# Patient Record
Sex: MALE | Race: WHITE | NOT HISPANIC OR LATINO | Employment: OTHER | URBAN - METROPOLITAN AREA
[De-identification: names, ages, dates, MRNs, and addresses within clinical notes are randomized per-mention and may not be internally consistent; named-entity substitution may affect disease eponyms.]

---

## 2023-02-07 PROBLEM — M54.16 LUMBAR RADICULITIS: Status: ACTIVE | Noted: 2023-02-07

## 2023-02-07 PROBLEM — M79.673 PAIN OF FOOT: Status: ACTIVE | Noted: 2023-02-07

## 2023-02-07 PROBLEM — M79.671 RIGHT FOOT PAIN: Status: ACTIVE | Noted: 2023-02-07

## 2023-02-07 PROBLEM — D69.6 THROMBOCYTOPENIA (CMS-HCC): Status: ACTIVE | Noted: 2023-02-07

## 2023-02-07 PROBLEM — B02.22 TRIGEMINAL NEURALGIA, POSTHERPETIC: Status: ACTIVE | Noted: 2023-02-07

## 2023-02-07 PROBLEM — N45.2 ORCHITIS: Status: ACTIVE | Noted: 2023-02-07

## 2023-02-07 PROBLEM — E78.5 DYSLIPIDEMIA: Status: ACTIVE | Noted: 2023-02-07

## 2023-02-07 PROBLEM — E55.9 VITAMIN D DEFICIENCY: Status: ACTIVE | Noted: 2023-02-07

## 2023-02-07 PROBLEM — Z86.19 HISTORY OF SHINGLES: Status: ACTIVE | Noted: 2023-02-07

## 2023-02-07 PROBLEM — I10 BENIGN HYPERTENSION: Status: ACTIVE | Noted: 2023-02-07

## 2023-02-07 PROBLEM — E29.1 HYPOGONADISM MALE: Status: ACTIVE | Noted: 2023-02-07

## 2023-02-07 PROBLEM — M77.41 METATARSALGIA OF RIGHT FOOT: Status: ACTIVE | Noted: 2023-02-07

## 2023-02-07 PROBLEM — K63.5 HYPERPLASTIC COLON POLYP: Status: ACTIVE | Noted: 2023-02-07

## 2023-02-07 RX ORDER — TESTOSTERONE 20.25 MG/1.25G
GEL TOPICAL
COMMUNITY
Start: 2021-05-17 | End: 2024-01-08 | Stop reason: WASHOUT

## 2023-02-07 RX ORDER — LISINOPRIL 10 MG/1
1 TABLET ORAL DAILY
COMMUNITY
Start: 2018-01-03 | End: 2024-01-08 | Stop reason: SDUPTHER

## 2023-03-20 PROBLEM — N45.2 ORCHITIS: Status: RESOLVED | Noted: 2023-02-07 | Resolved: 2023-03-20

## 2023-03-20 PROBLEM — M79.671 RIGHT FOOT PAIN: Status: RESOLVED | Noted: 2023-02-07 | Resolved: 2023-03-20

## 2023-03-20 PROBLEM — M79.673 PAIN OF FOOT: Status: RESOLVED | Noted: 2023-02-07 | Resolved: 2023-03-20

## 2023-03-21 ENCOUNTER — OFFICE VISIT (OUTPATIENT)
Dept: PRIMARY CARE | Facility: CLINIC | Age: 68
End: 2023-03-21
Payer: MEDICARE

## 2023-03-21 VITALS
HEART RATE: 76 BPM | DIASTOLIC BLOOD PRESSURE: 80 MMHG | WEIGHT: 172 LBS | BODY MASS INDEX: 25.4 KG/M2 | SYSTOLIC BLOOD PRESSURE: 132 MMHG | OXYGEN SATURATION: 96 %

## 2023-03-21 DIAGNOSIS — R29.2 HYPERREFLEXIA OF LOWER EXTREMITY: ICD-10-CM

## 2023-03-21 DIAGNOSIS — M51.37 DISC DISEASE, DEGENERATIVE, LUMBAR OR LUMBOSACRAL: ICD-10-CM

## 2023-03-21 DIAGNOSIS — E78.5 DYSLIPIDEMIA: ICD-10-CM

## 2023-03-21 DIAGNOSIS — E55.9 VITAMIN D DEFICIENCY: Primary | ICD-10-CM

## 2023-03-21 DIAGNOSIS — D69.6 THROMBOCYTOPENIA (CMS-HCC): ICD-10-CM

## 2023-03-21 PROBLEM — K63.5 HYPERPLASTIC COLON POLYP: Status: RESOLVED | Noted: 2023-02-07 | Resolved: 2023-03-21

## 2023-03-21 PROBLEM — Z86.19 HISTORY OF SHINGLES: Status: RESOLVED | Noted: 2023-02-07 | Resolved: 2023-03-21

## 2023-03-21 PROBLEM — B02.22 TRIGEMINAL NEURALGIA, POSTHERPETIC: Status: RESOLVED | Noted: 2023-02-07 | Resolved: 2023-03-21

## 2023-03-21 PROBLEM — M77.41 METATARSALGIA OF RIGHT FOOT: Status: RESOLVED | Noted: 2023-02-07 | Resolved: 2023-03-21

## 2023-03-21 PROCEDURE — 3075F SYST BP GE 130 - 139MM HG: CPT | Performed by: FAMILY MEDICINE

## 2023-03-21 PROCEDURE — 1160F RVW MEDS BY RX/DR IN RCRD: CPT | Performed by: FAMILY MEDICINE

## 2023-03-21 PROCEDURE — 1159F MED LIST DOCD IN RCRD: CPT | Performed by: FAMILY MEDICINE

## 2023-03-21 PROCEDURE — 1157F ADVNC CARE PLAN IN RCRD: CPT | Performed by: FAMILY MEDICINE

## 2023-03-21 PROCEDURE — 99214 OFFICE O/P EST MOD 30 MIN: CPT | Performed by: FAMILY MEDICINE

## 2023-03-21 PROCEDURE — 1036F TOBACCO NON-USER: CPT | Performed by: FAMILY MEDICINE

## 2023-03-21 PROCEDURE — 3079F DIAST BP 80-89 MM HG: CPT | Performed by: FAMILY MEDICINE

## 2023-03-21 ASSESSMENT — PATIENT HEALTH QUESTIONNAIRE - PHQ9
SUM OF ALL RESPONSES TO PHQ9 QUESTIONS 1 AND 2: 0
2. FEELING DOWN, DEPRESSED OR HOPELESS: NOT AT ALL
1. LITTLE INTEREST OR PLEASURE IN DOING THINGS: NOT AT ALL

## 2023-03-21 NOTE — PATIENT INSTRUCTIONS
You are due for a repeat Medicare Wellness Examine in September 2023.     Continue yearly eye and dental examines.     Colonoscopy was done in 2018 (repeat in 5-10 years)     Labs in September 2022 showed normal blood coutns, normal electrolytes, normalliver, kidney and thyroid function, your cholesterol was slightly elevated and your Current 10-Year ASCVD Risk: is 12.18% -which is considered Intermediate Risk for having a heart attack or stroke, anyone with a score > 7% is recommended to start a lipid-lowering medication sich as rosuvastatin 10 mg at bedtime, please let us know if agreeable to start.    Blood pressure looks great today at 132/80    follow up with derm for the skin lesions    Please consider the pneumonia shot series.     Please get MRI of the lumbar spine once approved by insurance.   We reviewed you last Lumbar xray.

## 2023-03-21 NOTE — PROGRESS NOTES
Subjective   Jasvir Puckett is a 67 y.o. male who presents for Follow-up (6 month follow up).    HPI  pt is using a CBT vape and is helping   last seen in May 2022    #) bilateral flank pain   - more on the left but can migrate to the right   - worse when cleaning (such as mopping)   - also with right lateral calf and some radiating into the anterior knee and anterior thigh- still bothersome with sleeping   - pain worse with lying flat  - never had Kidney stones in the past   - seems to improved with rest   - no OTC for pain   - no urinary symptoms or hematuria   - no nocturia   - no fever or chills   - occasional right radicular symptoms when sleeping   - Had Xrays on 5/19/21- Mild disc space height loss L5-S1. Miniscule anterior spurring not out of proportion to patient's age. Vertebral alignment is normal. No fracture or paraspinal mass. Minimal levoscoliosis apex at L3. No spondylolysis.   - has been getting manual therapy in the form of chiropractic manipulation and massage for several years, with some benefit but not sustained   - also has tried an inversion table.     #) Left ear pain - resolving, mostly gone   - still getting popping with blowing the nose   - had had several deviated septum surgeries   - hearing is good, no ringing in the ears   - chronic sinus congestion on allergy medications on and off   - no headache or dizziness   - no ear trauma  - some cerumen impaction- peroxide has seemed to help     #) HTN -- 132/80 today was great   - has a BP cuff at home  - has not been checking recently   - no headache , chest pain , dyspnea   - no palpitations     #) skin cancer on scalp - squamous in situ  - excision completed and all resected and healing well   - to get a right cheek excision- was squamous   - to follow up 6 months.     #) weight loss and cant gain muscle- weight is stable   - was on androgel in the past   - h/o hypogonadism on Androgel and viagra in the past.   - normal Testosterone on  11/1/17, recheck now   - has been a lifelong weight ; strength is good   - last used androgel   - sleep and mood is good     #) BPH- saw Dr Pfeiffer (nephro)  - no recommendation   - hasn’t seen for several years   - no hematuria   - PSA 11/1/17 0.86    #) Sleep apnea does not wearing CPAP seen by Dr. Garcia in the past   - Former smoker.   - denies dyspnea, cough or wheeze   - reports sleep is good    #) H/o HLD and was on niacin 500 mg and fish oil 1000 mg in the past.   - not currently taking any cholesterol medications   - checked on 11/1/17  - , HDL 69    #) depression seen by Dr. Quiñones in the past, on no medications  - mood is good   - denies HI/SI   - reports mood and sleep are good     #) Preventive:  Smoker: former   Etoh: occasional   Fam h/o: biological family unknown  Colonoscopy: 12/2017, repeat in 10 yrs, 1/29/18  PSA: 1.26 on 11/27/15  ASA 81: denies use  HepC screen: 11/1/17 NR   Fasting blood work: 1/18/14, 11/27/15, 11/1/17 , repeat now   Pt believes he is UTD on vaccines.  Nonsmoker.   Ashley Ha was prior PCP.   Last colonoscopy in 2002. Pt needs repeat colonoscopy. will check into getting with current provider.   declining the shingles shot    ROS was completed and all systems are negative with the exception of what was noted in the the HPI.     Objective     /80   Pulse 76   Wt 78 kg (172 lb)   SpO2 96%   BMI 25.40 kg/m²      Physical Exam  Vitals reviewed.   Constitutional:       General: He is not in acute distress.     Appearance: He is not toxic-appearing.   HENT:      Head: Normocephalic and atraumatic.      Right Ear: Tympanic membrane and ear canal normal. There is no impacted cerumen.      Left Ear: Tympanic membrane and ear canal normal. There is no impacted cerumen.      Nose: No congestion or rhinorrhea.      Mouth/Throat:      Mouth: Mucous membranes are moist.      Pharynx: No oropharyngeal exudate or posterior oropharyngeal erythema.   Eyes:      Extraocular  Movements: Extraocular movements intact.      Conjunctiva/sclera: Conjunctivae normal.      Pupils: Pupils are equal, round, and reactive to light.   Cardiovascular:      Rate and Rhythm: Normal rate and regular rhythm.      Heart sounds: Normal heart sounds. No murmur heard.  Pulmonary:      Effort: No respiratory distress.      Breath sounds: No wheezing.   Abdominal:      General: Bowel sounds are normal.      Palpations: Abdomen is soft.      Tenderness: There is no abdominal tenderness.   Musculoskeletal:         General: No deformity. Normal range of motion.      Cervical back: Neck supple. No tenderness.      Right lower leg: No edema.      Left lower leg: No edema.      Comments: Hyperreflexia of the right L4 (3/4) . slight atrophy of the right calf.    Lymphadenopathy:      Cervical: No cervical adenopathy.   Skin:     Findings: No bruising or rash.   Neurological:      Mental Status: He is alert.      Cranial Nerves: No cranial nerve deficit.      Motor: No weakness.      Gait: Gait normal.   Psychiatric:         Mood and Affect: Mood normal.         Assessment/Plan   Problem List Items Addressed This Visit          Endocrine/Metabolic    Vitamin D deficiency - Primary     On supplement             Hematologic    RESOLVED: Thrombocytopenia (CMS/HCC)       Other    Dyslipidemia     Deferring statin therapy at this time.           Other Visit Diagnoses       Disc disease, degenerative, lumbar or lumbosacral        Hyperreflexia of lower extremity              You are due for a repeat Medicare Wellness Examine in September 2023.     Continue yearly eye and dental examines.     Colonoscopy was done in 2018 (repeat in 5-10 years)     Labs in September 2022 showed normal blood coutns, normal electrolytes, normalliver, kidney and thyroid function, your cholesterol was slightly elevated and your Current 10-Year ASCVD Risk: is 12.18% -which is considered Intermediate Risk for having a heart attack or stroke, anyone  with a score > 7% is recommended to start a lipid-lowering medication sich as rosuvastatin 10 mg at bedtime, please let us know if agreeable to start.    Blood pressure looks great today at 132/80    follow up with derm for the skin lesions    Please consider the pneumonia shot series.     Please get MRI of the lumbar spine once approved by insurance.   We reviewed you last Lumbar xray.            Juliann Martin DO, MSMed, ABOM  [address]   [address]   [place] [address]  Ph. [phone]  Fx. [phone]

## 2024-01-08 ENCOUNTER — OFFICE VISIT (OUTPATIENT)
Dept: PRIMARY CARE | Facility: CLINIC | Age: 69
End: 2024-01-08
Payer: MEDICARE

## 2024-01-08 VITALS
BODY MASS INDEX: 25.77 KG/M2 | OXYGEN SATURATION: 98 % | WEIGHT: 174 LBS | SYSTOLIC BLOOD PRESSURE: 142 MMHG | DIASTOLIC BLOOD PRESSURE: 82 MMHG | HEIGHT: 69 IN | HEART RATE: 98 BPM

## 2024-01-08 DIAGNOSIS — Z00.00 ROUTINE GENERAL MEDICAL EXAMINATION AT HEALTH CARE FACILITY: Primary | ICD-10-CM

## 2024-01-08 DIAGNOSIS — E78.5 DYSLIPIDEMIA: ICD-10-CM

## 2024-01-08 DIAGNOSIS — I10 BENIGN HYPERTENSION: ICD-10-CM

## 2024-01-08 DIAGNOSIS — E55.9 AVITAMINOSIS D: ICD-10-CM

## 2024-01-08 DIAGNOSIS — N40.0 BENIGN PROSTATIC HYPERPLASIA WITHOUT LOWER URINARY TRACT SYMPTOMS: ICD-10-CM

## 2024-01-08 DIAGNOSIS — M51.37 DISC DISEASE, DEGENERATIVE, LUMBAR OR LUMBOSACRAL: ICD-10-CM

## 2024-01-08 PROCEDURE — 1036F TOBACCO NON-USER: CPT | Performed by: FAMILY MEDICINE

## 2024-01-08 PROCEDURE — 1159F MED LIST DOCD IN RCRD: CPT | Performed by: FAMILY MEDICINE

## 2024-01-08 PROCEDURE — 99213 OFFICE O/P EST LOW 20 MIN: CPT | Performed by: FAMILY MEDICINE

## 2024-01-08 PROCEDURE — 1170F FXNL STATUS ASSESSED: CPT | Performed by: FAMILY MEDICINE

## 2024-01-08 PROCEDURE — 3077F SYST BP >= 140 MM HG: CPT | Performed by: FAMILY MEDICINE

## 2024-01-08 PROCEDURE — G0438 PPPS, INITIAL VISIT: HCPCS | Performed by: FAMILY MEDICINE

## 2024-01-08 PROCEDURE — 3079F DIAST BP 80-89 MM HG: CPT | Performed by: FAMILY MEDICINE

## 2024-01-08 PROCEDURE — 1160F RVW MEDS BY RX/DR IN RCRD: CPT | Performed by: FAMILY MEDICINE

## 2024-01-08 RX ORDER — CYCLOBENZAPRINE HCL 10 MG
10 TABLET ORAL NIGHTLY PRN
Qty: 30 TABLET | Refills: 0 | Status: SHIPPED | OUTPATIENT
Start: 2024-01-08 | End: 2024-03-08

## 2024-01-08 RX ORDER — LISINOPRIL 20 MG/1
20 TABLET ORAL DAILY
Qty: 90 TABLET | Refills: 3 | Status: SHIPPED | OUTPATIENT
Start: 2024-01-08 | End: 2025-01-07

## 2024-01-08 RX ORDER — SILDENAFIL 100 MG/1
100 TABLET, FILM COATED ORAL AS NEEDED
Qty: 10 TABLET | Refills: 2 | Status: SHIPPED | OUTPATIENT
Start: 2024-01-08

## 2024-01-08 RX ORDER — SILDENAFIL 100 MG/1
TABLET, FILM COATED ORAL
COMMUNITY
Start: 2023-09-11 | End: 2024-01-08 | Stop reason: SDUPTHER

## 2024-01-08 ASSESSMENT — PATIENT HEALTH QUESTIONNAIRE - PHQ9
1. LITTLE INTEREST OR PLEASURE IN DOING THINGS: NOT AT ALL
SUM OF ALL RESPONSES TO PHQ9 QUESTIONS 1 AND 2: 0
2. FEELING DOWN, DEPRESSED OR HOPELESS: NOT AT ALL

## 2024-01-08 ASSESSMENT — ACTIVITIES OF DAILY LIVING (ADL)
GROCERY_SHOPPING: INDEPENDENT
MANAGING_FINANCES: INDEPENDENT
TAKING_MEDICATION: INDEPENDENT
DOING_HOUSEWORK: INDEPENDENT
DRESSING: INDEPENDENT
BATHING: INDEPENDENT

## 2024-01-08 NOTE — PROGRESS NOTES
Subjective   Reason for Visit: Jasvir Puckett is an 68 y.o. male here for a Medicare Wellness visit.          Reviewed all medications by prescribing practitioner or clinical pharmacist (such as prescriptions, OTCs, herbal therapies and supplements) and documented in the medical record.    HPI  pt is using a CBT vape and is helping -- stopped vaping  last seen in May 2022    #) bilateral flank pain - still getting it with activity   - more on the left but can migrate to the right   - worse when cleaning (such as mopping)   - also with right lateral calf and some radiating into the anterior knee and anterior thigh- still bothersome with sleeping   - pain worse with lying flat  - never had Kidney stones in the past   - seems to improved with rest   - no OTC for pain   - no urinary symptoms or hematuria   - no nocturia   - no fever or chills   - occasional right radicular symptoms when sleeping   - Had Xrays on 5/19/21- Mild disc space height loss L5-S1. Miniscule anterior spurring not out of proportion to patient's age. Vertebral alignment is normal. No fracture or paraspinal mass. Minimal levoscoliosis apex at L3. No spondylolysis.   - has been getting manual therapy in the form of chiropractic manipulation and massage for several years, with some benefit but not sustained   - also has tried an inversion table.     #) Left ear pain - still some popping- stable   - still getting popping with blowing the nose   - had had several deviated septum surgeries   - hearing is good, no ringing in the ears   - chronic sinus congestion on allergy medications on and off   - no headache or dizziness   - no ear trauma  - some cerumen impaction- peroxide has seemed to help     #) HTN -- still elevated   - has a BP cuff at home  - has not been checking at home   - no headache , chest pain , dyspnea   - no palpitations     #) skin cancer on scalp - squamous in situ  - excision completed and all resected and healing well   - to get a  "right cheek excision- was squamous   - to follow up 6 months.     #) weight loss and cant gain muscle- weight is stable , up 2#   - was on androgel in the past   - h/o hypogonadism on Androgel and viagra in the past.   - normal Testosterone on 11/1/17, recheck now   - has been a lifelong weight ; strength is good   - last used androgel   - sleep and mood is good     #) BPH- saw Dr Pfeiffer (nephro)  - no recommendation   - hasn’t seen for several years   - no hematuria   - PSA 11/1/17 0.86  -     #) Sleep apnea does not wearing CPAP seen by Dr. Garcia in the past   - Former smoker.   - denies dyspnea, cough or wheeze   - reports sleep is good    #) H/o HLD and was on niacin 500 mg and fish oil 1000 mg in the past.   - not currently taking any cholesterol medications   - checked on 11/1/17  - , HDL 69    #) Preventive:  Smoker: former   Etoh: occasional   Fam h/o: biological family unknown  Colonoscopy: 12/2017, repeat in 10 yrs, 1/29/18  PSA: 1.26 on 11/27/15  ASA 81: denies use  HepC screen: 11/1/17 NR   Fasting blood work: 1/18/14, 11/27/15, 11/1/17 , repeat now   Pt believes he is UTD on vaccines.  Nonsmoker.   Ashley Ha was prior PCP.   Last colonoscopy in 2002. Pt needs repeat colonoscopy. will check into getting with current provider.   declining the shingles shot    Patient Care Team:  Juliann Martin DO as PCP - General  Juliann Martin DO as PCP - [facility] Attributed Provider     Review of Systems  ROS was completed and all systems are negative with the exception of what was noted in the the HPI.     Objective   Vitals:  /88   Pulse 98   Ht 1.753 m (5' 9\")   Wt 78.9 kg (174 lb)   SpO2 98%   BMI 25.70 kg/m²       Physical Exam  GEN: A+O, no acute distress  HEENT: NC/AT, Oropharynx clear, no exudates, TM visualized, Extraoccular muscles intact, no facial droop; no thyromegaly or cervical LAD  RESP: CTAB, no wheezes   CV: RRR, no murmurs  ABD: soft, non-tender, + BS  SKIN: no rashes or " bruising, no peripheral edema   NEURO: CN II-XII grossly intact, moves all extremities equally, no tremor   PSYCH: normal affect, appropriate mood     Assessment/Plan   Problem List Items Addressed This Visit    None  We completed your medicare wellness examination today, repeat again in January 2025.     Continue yearly eye and dental examines.     Colonoscopy was done in 2018 with 2 hyperplastic polyps, (repeat in 2028)    Order for some updated fasting blood work.     Try the flexeril at bedtime for the back pain     Continue follow up with the dermatology for the skin cancer checks.     Blood pressure looks a little high today at 142/82. Goal is to get this under 130/80  Increase the lisinopril from 10 to 20 mg daily     Monitor for a brewing illness, covid and flu are going around.     Please consider the pneumonia shot series.     Follow up in 1 year or sooner as needed.

## 2024-01-08 NOTE — PATIENT INSTRUCTIONS
We completed your medicare wellness examination today, repeat again in January 2025.     Continue yearly eye and dental examines.     Colonoscopy was done in 2018 with 2 hyperplastic polyps, (repeat in 2028)    Order for some updated fasting blood work.     Try the flexeril at bedtime for the back pain     Continue follow up with the dermatology for the skin cancer checks.     Blood pressure looks a little high today at 142/82. Goal is to get this under 130/80  Increase the lisinopril from 10 to 20 mg daily     Monitor for a brewing illness, covid and flu are going around.     Please consider the pneumonia shot series.     Follow up in 1 year or sooner as needed.

## 2024-01-16 ENCOUNTER — LAB (OUTPATIENT)
Dept: LAB | Facility: LAB | Age: 69
End: 2024-01-16
Payer: MEDICARE

## 2024-01-16 DIAGNOSIS — I10 BENIGN HYPERTENSION: Primary | ICD-10-CM

## 2024-01-16 DIAGNOSIS — E55.9 AVITAMINOSIS D: ICD-10-CM

## 2024-01-16 DIAGNOSIS — E29.1 HYPOGONADISM MALE: ICD-10-CM

## 2024-01-16 DIAGNOSIS — E78.5 DYSLIPIDEMIA: ICD-10-CM

## 2024-01-16 LAB
ALBUMIN SERPL BCP-MCNC: 4.4 G/DL (ref 3.4–5)
ALP SERPL-CCNC: 66 U/L (ref 33–136)
ALT SERPL W P-5'-P-CCNC: 39 U/L (ref 10–52)
ANION GAP SERPL CALC-SCNC: 14 MMOL/L (ref 10–20)
AST SERPL W P-5'-P-CCNC: 23 U/L (ref 9–39)
BASOPHILS # BLD AUTO: 0.06 X10*3/UL (ref 0–0.1)
BASOPHILS NFR BLD AUTO: 1.1 %
BILIRUB SERPL-MCNC: 0.5 MG/DL (ref 0–1.2)
BUN SERPL-MCNC: 14 MG/DL (ref 6–23)
CALCIUM SERPL-MCNC: 9.4 MG/DL (ref 8.6–10.3)
CHLORIDE SERPL-SCNC: 104 MMOL/L (ref 98–107)
CHOLEST SERPL-MCNC: 216 MG/DL (ref 0–199)
CHOLESTEROL/HDL RATIO: 4.2
CO2 SERPL-SCNC: 25 MMOL/L (ref 21–32)
CREAT SERPL-MCNC: 1.03 MG/DL (ref 0.5–1.3)
EGFRCR SERPLBLD CKD-EPI 2021: 80 ML/MIN/1.73M*2
EOSINOPHIL # BLD AUTO: 0.07 X10*3/UL (ref 0–0.7)
EOSINOPHIL NFR BLD AUTO: 1.3 %
ERYTHROCYTE [DISTWIDTH] IN BLOOD BY AUTOMATED COUNT: 11.9 % (ref 11.5–14.5)
GLUCOSE SERPL-MCNC: 98 MG/DL (ref 74–99)
HCT VFR BLD AUTO: 43.6 % (ref 41–52)
HDLC SERPL-MCNC: 52 MG/DL
HGB BLD-MCNC: 15 G/DL (ref 13.5–17.5)
IMM GRANULOCYTES # BLD AUTO: 0.03 X10*3/UL (ref 0–0.7)
IMM GRANULOCYTES NFR BLD AUTO: 0.5 % (ref 0–0.9)
LDLC SERPL CALC-MCNC: 111 MG/DL
LYMPHOCYTES # BLD AUTO: 1.13 X10*3/UL (ref 1.2–4.8)
LYMPHOCYTES NFR BLD AUTO: 20.6 %
MCH RBC QN AUTO: 32.2 PG (ref 26–34)
MCHC RBC AUTO-ENTMCNC: 34.4 G/DL (ref 32–36)
MCV RBC AUTO: 94 FL (ref 80–100)
MONOCYTES # BLD AUTO: 0.5 X10*3/UL (ref 0.1–1)
MONOCYTES NFR BLD AUTO: 9.1 %
NEUTROPHILS # BLD AUTO: 3.7 X10*3/UL (ref 1.2–7.7)
NEUTROPHILS NFR BLD AUTO: 67.4 %
NON HDL CHOLESTEROL: 164 MG/DL (ref 0–149)
NRBC BLD-RTO: 0 /100 WBCS (ref 0–0)
PLATELET # BLD AUTO: 247 X10*3/UL (ref 150–450)
POTASSIUM SERPL-SCNC: 4.1 MMOL/L (ref 3.5–5.3)
PROT SERPL-MCNC: 6.8 G/DL (ref 6.4–8.2)
RBC # BLD AUTO: 4.66 X10*6/UL (ref 4.5–5.9)
SODIUM SERPL-SCNC: 139 MMOL/L (ref 136–145)
TRIGL SERPL-MCNC: 264 MG/DL (ref 0–149)
TSH SERPL-ACNC: 2.53 MIU/L (ref 0.44–3.98)
VLDL: 53 MG/DL (ref 0–40)
WBC # BLD AUTO: 5.5 X10*3/UL (ref 4.4–11.3)

## 2024-01-16 PROCEDURE — 84443 ASSAY THYROID STIM HORMONE: CPT

## 2024-01-16 PROCEDURE — 85025 COMPLETE CBC W/AUTO DIFF WBC: CPT

## 2024-01-16 PROCEDURE — 80061 LIPID PANEL: CPT

## 2024-01-16 PROCEDURE — 36415 COLL VENOUS BLD VENIPUNCTURE: CPT

## 2024-01-16 PROCEDURE — 80053 COMPREHEN METABOLIC PANEL: CPT

## 2024-01-16 PROCEDURE — 82306 VITAMIN D 25 HYDROXY: CPT

## 2024-01-16 PROCEDURE — 84153 ASSAY OF PSA TOTAL: CPT

## 2024-01-16 PROCEDURE — 84154 ASSAY OF PSA FREE: CPT

## 2024-01-17 LAB — 25(OH)D3 SERPL-MCNC: 64 NG/ML (ref 30–100)

## 2024-01-18 LAB
PSA FREE MFR SERPL: 31 %
PSA FREE SERPL-MCNC: 0.4 NG/ML
PSA SERPL IA-MCNC: 1.3 NG/ML (ref 0–4)

## 2024-06-13 DIAGNOSIS — N40.0 BENIGN PROSTATIC HYPERPLASIA WITHOUT LOWER URINARY TRACT SYMPTOMS: ICD-10-CM

## 2024-06-14 RX ORDER — SILDENAFIL 100 MG/1
TABLET, FILM COATED ORAL
Qty: 10 TABLET | Refills: 0 | Status: SHIPPED | OUTPATIENT
Start: 2024-06-14

## 2024-07-24 DIAGNOSIS — N40.0 BENIGN PROSTATIC HYPERPLASIA WITHOUT LOWER URINARY TRACT SYMPTOMS: ICD-10-CM

## 2024-07-24 RX ORDER — SILDENAFIL 100 MG/1
TABLET, FILM COATED ORAL
Qty: 10 TABLET | Refills: 11 | Status: SHIPPED | OUTPATIENT
Start: 2024-07-24

## 2024-07-29 ENCOUNTER — APPOINTMENT (OUTPATIENT)
Dept: DERMATOLOGY | Facility: CLINIC | Age: 69
End: 2024-07-29
Payer: MEDICARE

## 2024-07-29 DIAGNOSIS — L57.9 SKIN CHANGES DUE TO CHRONIC EXPOSURE TO NONIONIZING RADIATION: ICD-10-CM

## 2024-07-29 DIAGNOSIS — L57.0 ACTINIC KERATOSIS: Primary | ICD-10-CM

## 2024-07-29 DIAGNOSIS — Z85.828 HISTORY OF NONMELANOMA SKIN CANCER: ICD-10-CM

## 2024-07-29 DIAGNOSIS — D18.01 ANGIOMA OF SKIN: ICD-10-CM

## 2024-07-29 DIAGNOSIS — D22.9 BENIGN NEVUS: ICD-10-CM

## 2024-07-29 DIAGNOSIS — L81.4 LENTIGO: ICD-10-CM

## 2024-07-29 DIAGNOSIS — L90.5 SCAR CONDITIONS AND FIBROSIS OF SKIN: ICD-10-CM

## 2024-07-29 DIAGNOSIS — L82.1 SEBORRHEIC KERATOSIS: ICD-10-CM

## 2024-07-29 PROCEDURE — 1036F TOBACCO NON-USER: CPT | Performed by: NURSE PRACTITIONER

## 2024-07-29 PROCEDURE — 1159F MED LIST DOCD IN RCRD: CPT | Performed by: NURSE PRACTITIONER

## 2024-07-29 PROCEDURE — 1160F RVW MEDS BY RX/DR IN RCRD: CPT | Performed by: NURSE PRACTITIONER

## 2024-07-29 PROCEDURE — 99213 OFFICE O/P EST LOW 20 MIN: CPT | Performed by: NURSE PRACTITIONER

## 2024-07-29 PROCEDURE — 1157F ADVNC CARE PLAN IN RCRD: CPT | Performed by: NURSE PRACTITIONER

## 2024-07-29 NOTE — PROGRESS NOTES
Subjective     Jasvir Puckett is a 69 y.o. male who presents for the following: Skin Check.     Review of Systems:  No other skin or systemic complaints other than what is documented elsewhere in the note.    The following portions of the chart were reviewed this encounter and updated as appropriate:   Tobacco  Allergies  Meds  Problems  Med Hx  Surg Hx         Skin Cancer History  No skin cancer on file.      Specialty Problems    None       Objective   Well appearing patient in no apparent distress; mood and affect are within normal limits.    A focused skin examination was performed waist up. All findings within normal limits unless otherwise noted below.    Assessment/Plan   1. Actinic keratosis (6)  Mid Parietal Scalp, Right Buccal Cheek, Right Forehead (3), Right Zygomatic Area  Erythematous macules with gritty scale.    Discussed treatment with LN2. Patient wants to monitor for now. If lesions change shape, size, color or become painful or itchy or bleed, he will return to clinic for re-evaluation.     2. Angioma of skin  Scattered cherry-red papule(s).    A cherry hemangioma is a small macule (small, flat, smooth area) or papule (small, solid bump) formed from an overgrowth of tiny blood vessels in the skin. Cherry hemangiomas are characteristically red or purplish in color. They often first appear in middle adulthood and usually increase in number with age. Cherry hemangiomas are noncancerous (benign) and are common in adults.    The present appearance of the lesion is not worrisome but it should continue to be observed and testing/treatment may be warranted if change occurs.    3. Benign nevus  Scattered, uniform and benign-appearing, regular brown melanocytic papules and macules.    The present appearance of the lesion is not worrisome but it should continue to be observed and testing/treatment may be warranted if change occurs.    4. Seborrheic keratosis  Stuck on verrucous, tan-brown papules and  plaques.      Seborrheic keratoses are common noncancerous (benign) growths of unknown cause seen in adults due to a thickening of an area of the top skin layer. Seborrheic keratoses may appear as if they are stuck on to the skin. They have distinct borders, and they may appear as papules (small, solid bumps) or plaques (solid, raised patches that are bigger than a thumbnail). They may be the same color as your skin, or they may be pink, light brown, darker brown, or very dark brown, or sometimes may appear black.    There is no way to prevent new seborrheic keratoses from forming. Seborrheic keratoses can be removed, but removal is considered a cosmetic issue and is usually not covered by insurance.    PLAN  No treatment is needed unless there is irritation from clothing, such as itching or bleeding.  2.   Some lotions containing alpha hydroxy acids, salicylic acid, or urea may make the areas feel smoother with regular use but will not eliminate them.    5. Lentigo  Scattered tan macules in sun-exposed areas.    A solar lentigo (plural, solar lentigines), also known as a sun-induced freckle or senile lentigo, is a dark (hyperpigmented) lesion caused by natural or artificial ultraviolet (UV) light. Solar lentigines may be single or multiple. This type of lentigo is different from a simple lentigo (lentigo simplex) because it is caused by exposure to UV light. Solar lentigines are benign, but they do indicate excessive sun exposure, a risk factor for the development of skin cancer.    To prevent solar lentigines, avoid exposure to sunlight in midday (10 AM to 3 PM), wear sun-protective clothing (tightly woven clothes and hats), and apply sunscreen (SPF 30 UVA and UVB block).    The present appearance of the lesion is not worrisome but it should continue to be observed and testing/treatment may be warranted if change occurs.    6. Scar conditions and fibrosis of skin  Right Preauricular Area  Well healed scar at the  site(s) of prior treatment with no evidence of recurrence.          The scar is clear, there is no evidence of recurrence.  The present appearance of the scar is not worrisome but it should continue to be observed and testing/treatment may be warranted if change occurs.      7. History of nonmelanoma skin cancer    ABCDEs of melanoma and atypical moles were discussed with the patient.    Patient was instructed to perform monthly self skin examination.  We recommended that the patient have regular full skin exams given an increased risk of subsequent skin cancers.    The patient was instructed to use sun protective behaviors including use of broad spectrum sunscreens and sun protective clothing to reduce risk of skin cancers.    Warning signs of non-melanoma skin cancer discussed.    8. Skin changes due to chronic exposure to nonionizing radiation  Actinic changes in the form of freckles, lentigines and hyper/hypopigmentation     ABCDEs of melanoma and atypical moles were discussed with the patient.    Patient was instructed to perform monthly self skin examination.  We recommended that the patient have regular full skin exams given an increased risk of subsequent skin cancers.    The patient was instructed to use sun protective behaviors including use of broad spectrum sunscreens and sun protective clothing to reduce risk of skin cancers.    Warning signs of non-melanoma skin cancer discussed.    Patient presented for possible new lesions that were developing around the previous right pre auricular scar site. Patient reports lesions have resolved since scheduling the appointment. I saw a slightly traumatized SK to the right pre auricular area but no other lesions noted. Reassured patient not suspicious features at this time but to continue to monitor. Patient verbalized understanding and agreement.     Return to clinic in 1 year for skin check/follow up or sooner if needed

## 2024-07-29 NOTE — PATIENT INSTRUCTIONS

## 2024-12-10 DIAGNOSIS — I10 BENIGN HYPERTENSION: ICD-10-CM

## 2024-12-11 RX ORDER — LISINOPRIL 20 MG/1
20 TABLET ORAL DAILY
Qty: 90 TABLET | Refills: 3 | Status: SHIPPED | OUTPATIENT
Start: 2024-12-11

## 2025-01-07 ENCOUNTER — APPOINTMENT (OUTPATIENT)
Dept: PRIMARY CARE | Facility: CLINIC | Age: 70
End: 2025-01-07
Payer: MEDICARE

## 2025-07-29 ENCOUNTER — APPOINTMENT (OUTPATIENT)
Dept: DERMATOLOGY | Facility: CLINIC | Age: 70
End: 2025-07-29
Payer: MEDICARE

## 2025-07-29 DIAGNOSIS — L57.9 SKIN CHANGES DUE TO CHRONIC EXPOSURE TO NONIONIZING RADIATION: ICD-10-CM

## 2025-07-29 DIAGNOSIS — D18.01 ANGIOMA OF SKIN: ICD-10-CM

## 2025-07-29 DIAGNOSIS — L81.4 LENTIGO: ICD-10-CM

## 2025-07-29 DIAGNOSIS — L82.1 SEBORRHEIC KERATOSIS: ICD-10-CM

## 2025-07-29 DIAGNOSIS — D22.9 BENIGN NEVUS: ICD-10-CM

## 2025-07-29 DIAGNOSIS — B07.8 COMMON WART: Primary | ICD-10-CM

## 2025-07-29 DIAGNOSIS — L90.5 SCAR CONDITIONS AND FIBROSIS OF SKIN: ICD-10-CM

## 2025-07-29 DIAGNOSIS — L57.0 ACTINIC KERATOSIS: ICD-10-CM

## 2025-07-29 DIAGNOSIS — Z85.828 HISTORY OF NONMELANOMA SKIN CANCER: ICD-10-CM

## 2025-07-29 PROCEDURE — 1159F MED LIST DOCD IN RCRD: CPT | Performed by: NURSE PRACTITIONER

## 2025-07-29 PROCEDURE — 1160F RVW MEDS BY RX/DR IN RCRD: CPT | Performed by: NURSE PRACTITIONER

## 2025-07-29 PROCEDURE — 17000 DESTRUCT PREMALG LESION: CPT | Performed by: NURSE PRACTITIONER

## 2025-07-29 PROCEDURE — 99213 OFFICE O/P EST LOW 20 MIN: CPT | Performed by: NURSE PRACTITIONER

## 2025-07-29 PROCEDURE — 1157F ADVNC CARE PLAN IN RCRD: CPT | Performed by: NURSE PRACTITIONER

## 2025-07-29 PROCEDURE — 17003 DESTRUCT PREMALG LES 2-14: CPT | Performed by: NURSE PRACTITIONER

## 2025-07-29 NOTE — PROGRESS NOTES
Subjective     Jasvir Puckett is a 70 y.o. male who presents for the following: Skin Check.     Review of Systems:  No other skin or systemic complaints other than what is documented elsewhere in the note.    The following portions of the chart were reviewed this encounter and updated as appropriate:   Tobacco  Allergies  Meds  Problems  Med Hx  Surg Hx         Skin Cancer History  Biopsy Log Book  No skin cancers from Specimen Tracking.    Additional History      Specialty Problems    None       Objective   Well appearing patient in no apparent distress; mood and affect are within normal limits.    A focused skin examination was performed waist up. All findings within normal limits unless otherwise noted below.    Assessment/Plan   Skin Exam  1. ACTINIC KERATOSIS (9)  Left Antecubital Fossa, Mid Parietal Scalp (5), Right Forehead (3)  Thin erythematous papules with gritty scale  WHAT IS ACTINIC KERATOSIS?   - Actinic keratosis (AK) is a skin condition caused by sun damage. It causes scaly, rough, or bumpy spots on the skin.  - If left alone, AKs may turn into a skin cancer. People who burn easily or have trouble tanning are at more risk for developing AKs.   - There is no one test for AKs and diagnosis is made by clinical appearance. Treatment options include cryotherapy, therapy with lights, and various creams (e.g., topical 5-fluorocuracil, imiquimod).       To lower the chance of getting AK, you can:       ?  Stay out of the sun in the middle of the day (from 10 a.m. to 4 p.m.)       ?  Wear sunscreen - An SPF of at least 30 is best. The SPF number is on the sunscreen bottle or tube.       ?  Wear a wide-brimmed hat, long-sleeved shirt, long pants, or long skirt outside. A baseball hat does not give much protection.        ?  Do not use tanning beds.        ?  Keep a low-fat diet, less than 21% of calories should come from fat       ?  Take Vitamin B3 (nicotinomide) 500mg twice daily.      YOUR TREATMENT  PLAN  - At this time I recommend treatment with cryotherapy.  - Possible side effects of liquid nitrogen treatment reviewed including formation of blisters, crusting, tenderness, scar, and discoloration which may be permanent.  - Patient advised to return the office for re-evaluation if the treated lesion(s) do not resolve within 4-6 weeks. Patient verbalizes understanding.  - Destr of lesion - Left Antecubital Fossa, Mid Parietal Scalp (5), Right Forehead (3)  Complexity: simple    Destruction method: cryotherapy    Informed consent: discussed and consent obtained    Timeout:  patient name, date of birth, surgical site, and procedure verified  Lesion destroyed using liquid nitrogen: Yes    Cryotherapy cycles:  2  Outcome: patient tolerated procedure well with no complications    Post-procedure details: wound care instructions given      This Visit  - Follow Up In Dermatology - Established Patient  - Follow Up In Dermatology - Established Patient  2. ANGIOMA OF SKIN  Generalized  Scattered cherry-red papule(s).  A cherry hemangioma is a small macule (small, flat, smooth area) or papule (small, solid bump) formed from an overgrowth of tiny blood vessels in the skin. Cherry hemangiomas are characteristically red or purplish in color. They often first appear in middle adulthood and usually increase in number with age. Cherry hemangiomas are noncancerous (benign) and are common in adults.    The present appearance of the lesion is not worrisome but it should continue to be observed and testing/treatment may be warranted if change occurs.  This Visit  - Follow Up In Dermatology - Established Patient  - Follow Up In Dermatology - Established Patient  3. BENIGN NEVUS  Generalized  Scattered, uniform and benign-appearing, regular brown melanocytic papules and macules.  The present appearance of the lesion is not worrisome but it should continue to be observed and testing/treatment may be warranted if change occurs.  This  Visit  - Follow Up In Dermatology - Established Patient  - Follow Up In Dermatology - Established Patient  4. SEBORRHEIC KERATOSIS  Generalized  Stuck on verrucous, tan-brown papules and plaques.    Seborrheic keratoses are common noncancerous (benign) growths of unknown cause seen in adults due to a thickening of an area of the top skin layer. Seborrheic keratoses may appear as if they are stuck on to the skin. They have distinct borders, and they may appear as papules (small, solid bumps) or plaques (solid, raised patches that are bigger than a thumbnail). They may be the same color as your skin, or they may be pink, light brown, darker brown, or very dark brown, or sometimes may appear black.    There is no way to prevent new seborrheic keratoses from forming. Seborrheic keratoses can be removed, but removal is considered a cosmetic issue and is usually not covered by insurance.    PLAN  No treatment is needed unless there is irritation from clothing, such as itching or bleeding.  2.   Some lotions containing alpha hydroxy acids, salicylic acid, or urea may make the areas feel smoother with regular use but will not eliminate them.  This Visit  - Follow Up In Dermatology - Established Patient  - Follow Up In Dermatology - Established Patient  5. LENTIGO  Generalized  Scattered tan macules in sun-exposed areas.  A solar lentigo (plural, solar lentigines), also known as a sun-induced freckle or senile lentigo, is a dark (hyperpigmented) lesion caused by natural or artificial ultraviolet (UV) light. Solar lentigines may be single or multiple. This type of lentigo is different from a simple lentigo (lentigo simplex) because it is caused by exposure to UV light. Solar lentigines are benign, but they do indicate excessive sun exposure, a risk factor for the development of skin cancer.    To prevent solar lentigines, avoid exposure to sunlight in midday (10 AM to 3 PM), wear sun-protective clothing (tightly woven clothes  and hats), and apply sunscreen (SPF 30 UVA and UVB block).    The present appearance of the lesion is not worrisome but it should continue to be observed and testing/treatment may be warranted if change occurs.  This Visit  - Follow Up In Dermatology - Established Patient  - Follow Up In Dermatology - Established Patient  6. SCAR CONDITIONS AND FIBROSIS OF SKIN  Right Preauricular Area  Well healed scar at the site(s) of prior treatment with no evidence of recurrence.        The scar is clear, there is no evidence of recurrence.  The present appearance of the scar is not worrisome but it should continue to be observed and testing/treatment may be warranted if change occurs.    This Visit  - Follow Up In Dermatology - Established Patient  - Follow Up In Dermatology - Established Patient  7. HISTORY OF NONMELANOMA SKIN CANCER  Generalized  ABCDEs of melanoma and atypical moles were discussed with the patient.    Patient was instructed to perform monthly self skin examination.  We recommended that the patient have regular full skin exams given an increased risk of subsequent skin cancers.    The patient was instructed to use sun protective behaviors including use of broad spectrum sunscreens and sun protective clothing to reduce risk of skin cancers.    Warning signs of non-melanoma skin cancer discussed.  This Visit  - Follow Up In Dermatology - Established Patient  - Follow Up In Dermatology - Established Patient  8. SKIN CHANGES DUE TO CHRONIC EXPOSURE TO NONIONIZING RADIATION  Generalized  Actinic changes in the form of freckles, lentigines and hyper/hypopigmentation   ABCDEs of melanoma and atypical moles were discussed with the patient.    Patient was instructed to perform monthly self skin examination.  We recommended that the patient have regular full skin exams given an increased risk of subsequent skin cancers.    The patient was instructed to use sun protective behaviors including use of broad spectrum  sunscreens and sun protective clothing to reduce risk of skin cancers.    Warning signs of non-melanoma skin cancer discussed.  This Visit  - Follow Up In Dermatology - Established Patient  - Follow Up In Dermatology - Established Patient  9. COMMON WART  Left 2nd Metacarpophalangeal Region  3 mm Verrucous papules  -I reviewed the etiology of warts in detail with the patient. Discussed that this is a viral infection of the skin. Warts are difficult to eradicate as they occur in areas of relative immune incompetent skin. Treatments are aimed at creating local irritation to the skin, in order to activate the body's immune system to resolve the viral infection.   -Treatment options discussed with the family including topical therapies.  -Today elect to do the following:   -Start OTC salicylic acid 40% to involved areas once daily.  Instructions provided for use.     Return to clinic in 1 year for skin check/follow up or sooner if needed

## 2025-07-29 NOTE — PATIENT INSTRUCTIONS

## 2026-07-31 ENCOUNTER — APPOINTMENT (OUTPATIENT)
Dept: DERMATOLOGY | Facility: CLINIC | Age: 71
End: 2026-07-31
Payer: MEDICARE